# Patient Record
Sex: FEMALE | Race: WHITE | NOT HISPANIC OR LATINO | Employment: FULL TIME | URBAN - METROPOLITAN AREA
[De-identification: names, ages, dates, MRNs, and addresses within clinical notes are randomized per-mention and may not be internally consistent; named-entity substitution may affect disease eponyms.]

---

## 2021-03-11 ENCOUNTER — HOSPITAL ENCOUNTER (EMERGENCY)
Facility: HOSPITAL | Age: 51
Discharge: HOME | End: 2021-03-11
Admitting: EMERGENCY MEDICINE

## 2021-03-11 ENCOUNTER — APPOINTMENT (OUTPATIENT)
Dept: CT IMAGING | Facility: HOSPITAL | Age: 51
End: 2021-03-11

## 2021-03-11 VITALS
BODY MASS INDEX: 31.09 KG/M2 | RESPIRATION RATE: 16 BRPM | HEART RATE: 70 BPM | TEMPERATURE: 98 F | WEIGHT: 175.49 LBS | DIASTOLIC BLOOD PRESSURE: 48 MMHG | HEIGHT: 63 IN | OXYGEN SATURATION: 95 % | SYSTOLIC BLOOD PRESSURE: 115 MMHG

## 2021-03-11 DIAGNOSIS — R51.9 NONINTRACTABLE HEADACHE, UNSPECIFIED CHRONICITY PATTERN, UNSPECIFIED HEADACHE TYPE: Primary | ICD-10-CM

## 2021-03-11 PROCEDURE — 25010000002 HYDROMORPHONE PER 4 MG: Performed by: NURSE PRACTITIONER

## 2021-03-11 PROCEDURE — 25010000002 PROCHLORPERAZINE 10 MG/2ML SOLUTION: Performed by: NURSE PRACTITIONER

## 2021-03-11 PROCEDURE — 70450 CT HEAD/BRAIN W/O DYE: CPT

## 2021-03-11 PROCEDURE — 96374 THER/PROPH/DIAG INJ IV PUSH: CPT

## 2021-03-11 PROCEDURE — 96375 TX/PRO/DX INJ NEW DRUG ADDON: CPT

## 2021-03-11 PROCEDURE — 99283 EMERGENCY DEPT VISIT LOW MDM: CPT

## 2021-03-11 PROCEDURE — 25010000002 DIPHENHYDRAMINE PER 50 MG: Performed by: NURSE PRACTITIONER

## 2021-03-11 RX ORDER — LEVOTHYROXINE SODIUM 0.07 MG/1
75 TABLET ORAL DAILY
COMMUNITY

## 2021-03-11 RX ORDER — PROCHLORPERAZINE EDISYLATE 5 MG/ML
10 INJECTION INTRAMUSCULAR; INTRAVENOUS ONCE
Status: COMPLETED | OUTPATIENT
Start: 2021-03-11 | End: 2021-03-11

## 2021-03-11 RX ORDER — HYDROMORPHONE HCL 110MG/55ML
1 PATIENT CONTROLLED ANALGESIA SYRINGE INTRAVENOUS ONCE
Status: COMPLETED | OUTPATIENT
Start: 2021-03-11 | End: 2021-03-11

## 2021-03-11 RX ORDER — SODIUM CHLORIDE 0.9 % (FLUSH) 0.9 %
10 SYRINGE (ML) INJECTION AS NEEDED
Status: DISCONTINUED | OUTPATIENT
Start: 2021-03-11 | End: 2021-03-11 | Stop reason: HOSPADM

## 2021-03-11 RX ORDER — TRAMADOL HYDROCHLORIDE 50 MG/1
50 TABLET ORAL EVERY 6 HOURS PRN
COMMUNITY

## 2021-03-11 RX ORDER — DIPHENHYDRAMINE HYDROCHLORIDE 50 MG/ML
25 INJECTION INTRAMUSCULAR; INTRAVENOUS ONCE
Status: COMPLETED | OUTPATIENT
Start: 2021-03-11 | End: 2021-03-11

## 2021-03-11 RX ADMIN — PROCHLORPERAZINE EDISYLATE 10 MG: 5 INJECTION INTRAMUSCULAR; INTRAVENOUS at 10:26

## 2021-03-11 RX ADMIN — HYDROMORPHONE HYDROCHLORIDE 1 MG: 2 INJECTION, SOLUTION INTRAMUSCULAR; INTRAVENOUS; SUBCUTANEOUS at 11:33

## 2021-03-11 RX ADMIN — SODIUM CHLORIDE 1000 ML: 9 INJECTION, SOLUTION INTRAVENOUS at 10:26

## 2021-03-11 RX ADMIN — DIPHENHYDRAMINE HYDROCHLORIDE 25 MG: 50 INJECTION, SOLUTION INTRAMUSCULAR; INTRAVENOUS at 10:27

## 2021-03-11 NOTE — ED NOTES
1024- notified CT that pt was being medicated then would come over      Liz Calhoun  03/11/21 6891

## 2021-03-11 NOTE — DISCHARGE INSTRUCTIONS
Increase fluid intake, rest for the remainder of the day.  Keep appointment for MRI 3/20/2021 and follow-up primary care provider.

## 2021-03-11 NOTE — ED PROVIDER NOTES
Subjective   History: Patient is a 50-year-old female complains of left-sided headache for the last 3 to 4 weeks that is progressively getting worse.  States there is always pressure to the left side of her head but the intensity and sharp shooting pains is intermittent.  Radiates left neck and ear.  Completed antibiotics by her PCP 2 weeks ago without improvement.  Reports when she lays down it gets worse she was unable to sleep in the bed last night had to sleep sitting up in the chair.  Has some nausea with blurry vision, no double vision.  Was given tramadol by PCP without relief.  Also reports she has tried hot showers and essential oils without relief.  Scheduled for an MRI 3/20/2021.  Denies fever chills chest pain short of breath abdominal pain dysuria.  Denies acute onset or thunderclap.  Denies any weakness to any extremities difficulty ambulating slurred speech.  Denies history of headache or migraines.      Onset: 3 to 4 weeks  Location: Left side scalp  Duration: Constant, intensity waxes and wanes  Character: Pressure  Aggravating/Alleviating factors: Laying down makes it worse  Radiation left ear and neck  Severity: Moderate to severe            Review of Systems   Constitutional: Negative for chills, fatigue and fever.   HENT: Positive for ear pain. Negative for congestion, sore throat, tinnitus and trouble swallowing.    Eyes: Positive for visual disturbance. Negative for photophobia and discharge.   Respiratory: Negative for cough and shortness of breath.    Cardiovascular: Negative for chest pain.   Gastrointestinal: Positive for nausea. Negative for abdominal pain, diarrhea and vomiting.   Genitourinary: Negative for dysuria, frequency and urgency.   Musculoskeletal: Positive for neck pain. Negative for back pain and myalgias.   Skin: Negative for rash.   Neurological: Positive for headaches. Negative for dizziness, syncope, facial asymmetry, speech difficulty, weakness, light-headedness and  numbness.   Psychiatric/Behavioral: Negative for confusion.       History reviewed. No pertinent past medical history.    Allergies   Allergen Reactions   • Sulfa Antibiotics Hives       History reviewed. No pertinent surgical history.    History reviewed. No pertinent family history.    Social History     Socioeconomic History   • Marital status:      Spouse name: Not on file   • Number of children: Not on file   • Years of education: Not on file   • Highest education level: Not on file           Objective   Physical Exam  Vitals reviewed.   Constitutional:       General: She is not in acute distress.     Appearance: Normal appearance. She is normal weight. She is not toxic-appearing.   HENT:      Head: Normocephalic and atraumatic.      Comments: No tenderness to left temporal region or cordlike structure.     Right Ear: Tympanic membrane normal.      Left Ear: Tympanic membrane normal.      Nose: Nose normal.      Mouth/Throat:      Mouth: Mucous membranes are moist.      Pharynx: Oropharynx is clear.   Eyes:      General: No visual field deficit or scleral icterus.     Extraocular Movements: Extraocular movements intact.      Conjunctiva/sclera: Conjunctivae normal.      Pupils: Pupils are equal, round, and reactive to light.   Neck:      Meningeal: Brudzinski's sign absent.      Comments: No tenderness on firm palpation to cervical thoracic lumbar spine.  No surface trauma crepitus step-off.  Chin to chest created increased pressure to left side of head, no neck pain.  Cardiovascular:      Rate and Rhythm: Normal rate.      Pulses: Normal pulses.      Heart sounds: Normal heart sounds. No murmur.   Pulmonary:      Effort: Pulmonary effort is normal.      Breath sounds: Normal breath sounds.   Abdominal:      General: Abdomen is flat. Bowel sounds are normal. There is no distension.      Palpations: Abdomen is soft.      Tenderness: There is no abdominal tenderness. There is no guarding or rebound.  "  Musculoskeletal:         General: Normal range of motion.      Cervical back: Full passive range of motion without pain, normal range of motion and neck supple. No rigidity.   Lymphadenopathy:      Cervical: No cervical adenopathy.      Right cervical: No posterior cervical adenopathy.     Left cervical: No posterior cervical adenopathy.   Skin:     General: Skin is warm and dry.      Findings: No rash.   Neurological:      General: No focal deficit present.      Mental Status: She is alert and oriented to person, place, and time.      GCS: GCS eye subscore is 4. GCS verbal subscore is 5. GCS motor subscore is 6.      Cranial Nerves: No cranial nerve deficit, dysarthria or facial asymmetry.      Sensory: Sensation is intact.      Motor: No weakness or pronator drift.      Coordination: Coordination normal. Heel to Shin Test normal.   Psychiatric:         Mood and Affect: Mood normal.         Behavior: Behavior normal.         Thought Content: Thought content normal.         Judgment: Judgment normal.         Procedures           ED Course    /79 (BP Location: Left arm, Patient Position: Sitting)   Pulse 74   Temp 98.1 °F (36.7 °C) (Oral)   Resp 14   Ht 160 cm (63\")   Wt 79.6 kg (175 lb 7.8 oz)   SpO2 99%   Breastfeeding No   BMI 31.09 kg/m²   Labs Reviewed - No data to display  Medications   sodium chloride 0.9 % flush 10 mL (has no administration in time range)   sodium chloride 0.9 % bolus 1,000 mL (0 mL Intravenous Stopped 3/11/21 1123)   diphenhydrAMINE (BENADRYL) injection 25 mg (25 mg Intravenous Given 3/11/21 1027)   prochlorperazine (COMPAZINE) injection 10 mg (10 mg Intravenous Given 3/11/21 1026)   HYDROmorphone (DILAUDID) injection 1 mg (1 mg Intravenous Given 3/11/21 1133)     CT Head Without Contrast    Result Date: 3/11/2021  Normal CT of the brain without contrast.  Electronically Signed By-Katy Og MD On:3/11/2021 11:16 AM This report was finalized on [id number] by  Katy " MD Earle.                                           [facility]     I examined the patient using the appropriate personal protective equipment.      DISPOSITION:   Chart Review: Oncology appointments and follow-ups for breast CA  Comorbidity:  has no past medical history on file.  Differentials:this list is not all inclusive and does not constitute the entirety of considered causes --> migraine temporal arteritis metastasis    Labs:  Not Applicable      CT Head Without Contrast    Result Date: 3/11/2021  Normal CT of the brain without contrast.  Electronically Signed By-Katy Og MD On:3/11/2021 11:16 AM This report was finalized on [id number] by  Katy Og MD.      Disposition/Treatment:    IV established given Benadryl and Compazine with some improvement but not total relief.  Additional Dilaudid given.  CT head scan negative for mass hemorrhage or shift.  Physical exam negative for any large neurological deficits.  Discussed keeping MRI appointment 3/20/2021 and to follow back up with primary care provider.  discussed emergent reasons with patient to return to ER.  Patient verbalized understanding plan of care.   at bedside to drive patient home.    Prescription: None    Final diagnoses:   Nonintractable headache, unspecified chronicity pattern, unspecified headache type            Gina Brown, APRN  03/11/21 1134

## 2021-07-28 ENCOUNTER — TRANSCRIBE ORDERS (OUTPATIENT)
Dept: ADMINISTRATIVE | Facility: HOSPITAL | Age: 51
End: 2021-07-28

## 2021-07-28 DIAGNOSIS — R07.9 CHEST PAIN, UNSPECIFIED TYPE: Primary | ICD-10-CM

## 2021-08-05 ENCOUNTER — APPOINTMENT (OUTPATIENT)
Dept: CARDIOLOGY | Facility: HOSPITAL | Age: 51
End: 2021-08-05

## 2021-08-09 ENCOUNTER — APPOINTMENT (OUTPATIENT)
Dept: CARDIOLOGY | Facility: HOSPITAL | Age: 51
End: 2021-08-09

## 2021-08-19 ENCOUNTER — APPOINTMENT (OUTPATIENT)
Dept: CARDIOLOGY | Facility: HOSPITAL | Age: 51
End: 2021-08-19

## 2021-08-30 ENCOUNTER — HOSPITAL ENCOUNTER (OUTPATIENT)
Dept: CARDIOLOGY | Facility: HOSPITAL | Age: 51
Discharge: HOME | End: 2021-08-30
Admitting: NURSE PRACTITIONER

## 2021-08-30 VITALS
DIASTOLIC BLOOD PRESSURE: 55 MMHG | SYSTOLIC BLOOD PRESSURE: 98 MMHG | WEIGHT: 175 LBS | BODY MASS INDEX: 31.01 KG/M2 | HEIGHT: 63 IN

## 2021-08-30 DIAGNOSIS — R07.9 CHEST PAIN, UNSPECIFIED TYPE: ICD-10-CM

## 2021-08-30 PROCEDURE — 93306 TTE W/DOPPLER COMPLETE: CPT | Performed by: INTERNAL MEDICINE

## 2021-08-30 PROCEDURE — 93306 TTE W/DOPPLER COMPLETE: CPT

## 2021-08-31 LAB
BH CV ECHO MEAS - ACS: 2.3 CM
BH CV ECHO MEAS - AO ROOT AREA (BSA CORRECTED): 1.8
BH CV ECHO MEAS - AO ROOT AREA: 8.2 CM^2
BH CV ECHO MEAS - AO ROOT DIAM: 3.2 CM
BH CV ECHO MEAS - BSA(HAYCOCK): 1.9 M^2
BH CV ECHO MEAS - BSA: 1.8 M^2
BH CV ECHO MEAS - BZI_BMI: 31 KILOGRAMS/M^2
BH CV ECHO MEAS - BZI_METRIC_HEIGHT: 160 CM
BH CV ECHO MEAS - BZI_METRIC_WEIGHT: 79.4 KG
BH CV ECHO MEAS - EDV(CUBED): 102.3 ML
BH CV ECHO MEAS - EDV(MOD-SP4): 72.8 ML
BH CV ECHO MEAS - EDV(TEICH): 101.2 ML
BH CV ECHO MEAS - EF(CUBED): 63.5 %
BH CV ECHO MEAS - EF(MOD-BP): 64 %
BH CV ECHO MEAS - EF(MOD-SP4): 64.2 %
BH CV ECHO MEAS - EF(TEICH): 55 %
BH CV ECHO MEAS - ESV(CUBED): 37.4 ML
BH CV ECHO MEAS - ESV(MOD-SP4): 26 ML
BH CV ECHO MEAS - ESV(TEICH): 45.5 ML
BH CV ECHO MEAS - FS: 28.5 %
BH CV ECHO MEAS - IVS/LVPW: 0.95
BH CV ECHO MEAS - IVSD: 0.82 CM
BH CV ECHO MEAS - LA DIMENSION(2D): 2.8 CM
BH CV ECHO MEAS - LV DIASTOLIC VOL/BSA (35-75): 39.9 ML/M^2
BH CV ECHO MEAS - LV MASS(C)D: 130.3 GRAMS
BH CV ECHO MEAS - LV MASS(C)DI: 71.3 GRAMS/M^2
BH CV ECHO MEAS - LV MAX PG: 3 MMHG
BH CV ECHO MEAS - LV MEAN PG: 1.6 MMHG
BH CV ECHO MEAS - LV SYSTOLIC VOL/BSA (12-30): 14.3 ML/M^2
BH CV ECHO MEAS - LV V1 MAX: 86 CM/SEC
BH CV ECHO MEAS - LV V1 MEAN: 59.5 CM/SEC
BH CV ECHO MEAS - LV V1 VTI: 17.6 CM
BH CV ECHO MEAS - LVIDD: 4.7 CM
BH CV ECHO MEAS - LVIDS: 3.3 CM
BH CV ECHO MEAS - LVOT AREA: 3.7 CM^2
BH CV ECHO MEAS - LVOT DIAM: 2.2 CM
BH CV ECHO MEAS - LVPWD: 0.87 CM
BH CV ECHO MEAS - MV A MAX VEL: 49.2 CM/SEC
BH CV ECHO MEAS - MV DEC SLOPE: 282.7 CM/SEC^2
BH CV ECHO MEAS - MV DEC TIME: 0.28 SEC
BH CV ECHO MEAS - MV E MAX VEL: 80.4 CM/SEC
BH CV ECHO MEAS - MV E/A: 1.6
BH CV ECHO MEAS - MV MAX PG: 3.2 MMHG
BH CV ECHO MEAS - MV MEAN PG: 1.3 MMHG
BH CV ECHO MEAS - MV V2 MAX: 89 CM/SEC
BH CV ECHO MEAS - MV V2 MEAN: 53.8 CM/SEC
BH CV ECHO MEAS - MV V2 VTI: 26.8 CM
BH CV ECHO MEAS - MVA(VTI): 2.4 CM^2
BH CV ECHO MEAS - PA ACC TIME: 0.1 SEC
BH CV ECHO MEAS - PA MAX PG (FULL): 1.1 MMHG
BH CV ECHO MEAS - PA MAX PG: 2.7 MMHG
BH CV ECHO MEAS - PA MEAN PG (FULL): 0.71 MMHG
BH CV ECHO MEAS - PA MEAN PG: 1.6 MMHG
BH CV ECHO MEAS - PA PR(ACCEL): 35.4 MMHG
BH CV ECHO MEAS - PA V2 MAX: 81.8 CM/SEC
BH CV ECHO MEAS - PA V2 MEAN: 59.7 CM/SEC
BH CV ECHO MEAS - PA V2 VTI: 20.1 CM
BH CV ECHO MEAS - PULM A REVS DUR: 0.11 SEC
BH CV ECHO MEAS - PULM A REVS VEL: 33.6 CM/SEC
BH CV ECHO MEAS - PULM DIAS VEL: 49 CM/SEC
BH CV ECHO MEAS - PULM S/D: 1.4
BH CV ECHO MEAS - PULM SYS VEL: 71 CM/SEC
BH CV ECHO MEAS - PVA(I,A): 2.9 CM^2
BH CV ECHO MEAS - PVA(I,D): 2.9 CM^2
BH CV ECHO MEAS - PVA(V,A): 3.2 CM^2
BH CV ECHO MEAS - PVA(V,D): 3.2 CM^2
BH CV ECHO MEAS - QP/QS: 0.9
BH CV ECHO MEAS - RAP SYSTOLE: 3 MMHG
BH CV ECHO MEAS - RV MAX PG: 1.6 MMHG
BH CV ECHO MEAS - RV MEAN PG: 0.88 MMHG
BH CV ECHO MEAS - RV V1 MAX: 63 CM/SEC
BH CV ECHO MEAS - RV V1 MEAN: 44.3 CM/SEC
BH CV ECHO MEAS - RV V1 VTI: 14.1 CM
BH CV ECHO MEAS - RVDD: 2.6 CM
BH CV ECHO MEAS - RVOT AREA: 4.2 CM^2
BH CV ECHO MEAS - RVOT DIAM: 2.3 CM
BH CV ECHO MEAS - RVSP: 18 MMHG
BH CV ECHO MEAS - SI(CUBED): 35.5 ML/M^2
BH CV ECHO MEAS - SI(LVOT): 35.6 ML/M^2
BH CV ECHO MEAS - SI(MOD-SP4): 25.6 ML/M^2
BH CV ECHO MEAS - SI(TEICH): 30.4 ML/M^2
BH CV ECHO MEAS - SV(CUBED): 64.9 ML
BH CV ECHO MEAS - SV(LVOT): 65.1 ML
BH CV ECHO MEAS - SV(MOD-SP4): 46.8 ML
BH CV ECHO MEAS - SV(RVOT): 58.6 ML
BH CV ECHO MEAS - SV(TEICH): 55.6 ML
BH CV ECHO MEAS - TR MAX VEL: 193.9 CM/SEC